# Patient Record
Sex: FEMALE | Race: WHITE
[De-identification: names, ages, dates, MRNs, and addresses within clinical notes are randomized per-mention and may not be internally consistent; named-entity substitution may affect disease eponyms.]

---

## 2018-01-03 ENCOUNTER — HOSPITAL ENCOUNTER (OUTPATIENT)
Dept: HOSPITAL 89 - CT | Age: 38
End: 2018-01-03
Attending: OBSTETRICS & GYNECOLOGY
Payer: COMMERCIAL

## 2018-01-03 VITALS — BODY MASS INDEX: 21.74 KG/M2

## 2018-01-03 DIAGNOSIS — R10.32: Primary | ICD-10-CM

## 2018-01-03 PROCEDURE — 74178 CT ABD&PLV WO CNTR FLWD CNTR: CPT

## 2018-01-03 NOTE — RADIOLOGY IMAGING REPORT
FACILITY: [facility] - [place] 

 

PATIENT NAME: Ana Leonardo

: 1980

MR: [id number]

V: [id number]

EXAM DATE: 

ORDERING PHYSICIAN: MOHSEN CAMPBELL

TECHNOLOGIST: 

 

Location: [facility] - [place]

Patient: Ana Leonardo

: 1980

MRN: [id number]

Visit/Account:[id number]

Date of Sevice:  2018

 

ACCESSION #: [id number]

 

CT abdomen and pelvis without and with with IV contrast

 

Indication: Left lower abdominal pain.

 

Comparison:   10/4/2017..

 

Technique:   Axial CT images were obtained through the abdomen and pelvis prior to and during injecti
on of nonionic iodinated intravenous contrast. Reformatted coronal and sagittal images were also obta
ined.

One of the following dose optimization techniques was utilized in the performance of this exam: Autom
ated exposure control; adjustment of the mA and/or kV according to the patient's size; or use of an i
terative  reconstruction technique.  Specific details can be referenced in the facility's radiology C
T exam operational policy.

Contrast:   75 ml of Isovue-370  IV contrast.

 

Findings:

Lower lung fields: Limited views lower lung field are unremarkable.

 

Liver: No focal parenchymal abnormality of the liver.

Biliary: Gallbladder appears unremarkable as well as the intra and extra hepatic biliary system.

Pancreas: Normal appearance.

Spleen: Normal appearance.

Adrenal glands: Unremarkable.

Kidneys / retroperitoneum: No evidence of nephrolithiasis or hydronephrosis is no solid renal lesions
.

 

 

Bowel / peritoneum / mesenteries: The visualized gastrointestinal tract is within normal limits. No f
ocal abnormality or obstruction. The appendix is not definitely visualized. No discrete secondary sig
ns of appendicitis.

No free air, free fluid, fluid collections or areas of inflammation.

 

Lymph node assessment: No pathologic adenopathy identified.

 

Pelvic  structures: The uterus is retroverted. There is again fluid seen in the endocervical canal 
which is unchanged from the previous examination. The aorta shows no other focal abnormality. The ova
nahomi are unremarkable. The remaining pelvic structures visualized within normal limits.

 

 

Vessels: No significant atherosclerotic calcifications seen throughout a nonaneurysmal abdominal aort
a and branches.

 

Musculoskeletal / Body wall: No acute or aggressive osseous abnormality. Bilateral pars defect at the
 L4 level without spondylolisthesis. This is unchanged.

 

 

 

IMPRESSION:

1. No acute intra-abdominal abnormality

2. Chronic stable findings as above.

 

Report Dictated By: Bhupendra Saucedo at 1/3/2018 8:36 PM

 

Report E-Signed By: Bhupendra Saucedo  at 1/3/2018 8:46 PM

 

WSN:[id number]

## 2018-01-25 RX ADMIN — ROPIVACAINE HYDROCHLORIDE ONE MCG: 2 INJECTION, SOLUTION EPIDURAL; INFILTRATION at 12:23

## 2018-01-25 RX ADMIN — ROPIVACAINE HYDROCHLORIDE ONE: 2 INJECTION, SOLUTION EPIDURAL; INFILTRATION at 12:23

## 2018-01-26 ENCOUNTER — HOSPITAL ENCOUNTER (OUTPATIENT)
Dept: HOSPITAL 89 - OR | Age: 38
Discharge: HOME | End: 2018-01-26
Attending: OBSTETRICS & GYNECOLOGY
Payer: COMMERCIAL

## 2018-01-26 VITALS — SYSTOLIC BLOOD PRESSURE: 112 MMHG | DIASTOLIC BLOOD PRESSURE: 74 MMHG

## 2018-01-26 VITALS — DIASTOLIC BLOOD PRESSURE: 72 MMHG | SYSTOLIC BLOOD PRESSURE: 108 MMHG

## 2018-01-26 VITALS — DIASTOLIC BLOOD PRESSURE: 79 MMHG | SYSTOLIC BLOOD PRESSURE: 117 MMHG

## 2018-01-26 VITALS
WEIGHT: 136 LBS | WEIGHT: 136 LBS | BODY MASS INDEX: 20.61 KG/M2 | HEIGHT: 68 IN | BODY MASS INDEX: 20.61 KG/M2 | HEIGHT: 68 IN

## 2018-01-26 VITALS — SYSTOLIC BLOOD PRESSURE: 108 MMHG | DIASTOLIC BLOOD PRESSURE: 78 MMHG

## 2018-01-26 VITALS — DIASTOLIC BLOOD PRESSURE: 85 MMHG | SYSTOLIC BLOOD PRESSURE: 112 MMHG

## 2018-01-26 VITALS — SYSTOLIC BLOOD PRESSURE: 113 MMHG | DIASTOLIC BLOOD PRESSURE: 82 MMHG

## 2018-01-26 VITALS — DIASTOLIC BLOOD PRESSURE: 69 MMHG | SYSTOLIC BLOOD PRESSURE: 101 MMHG

## 2018-01-26 DIAGNOSIS — N94.4: ICD-10-CM

## 2018-01-26 DIAGNOSIS — R10.32: ICD-10-CM

## 2018-01-26 DIAGNOSIS — N94.10: ICD-10-CM

## 2018-01-26 DIAGNOSIS — R10.31: Primary | ICD-10-CM

## 2018-01-26 LAB — PLATELET COUNT, AUTOMATED: 267 K/UL (ref 150–450)

## 2018-01-26 PROCEDURE — 88305 TISSUE EXAM BY PATHOLOGIST: CPT

## 2018-01-26 PROCEDURE — 36415 COLL VENOUS BLD VENIPUNCTURE: CPT

## 2018-01-26 PROCEDURE — 84703 CHORIONIC GONADOTROPIN ASSAY: CPT

## 2018-01-26 PROCEDURE — 58662 LAPAROSCOPY EXCISE LESIONS: CPT

## 2018-01-26 PROCEDURE — 85025 COMPLETE CBC W/AUTO DIFF WBC: CPT

## 2018-01-26 NOTE — SHORT(OUTPT) DISCHARGE SUMMARY
Discharge Summary


Reason for Hosp/Final Diag:  


(1) Endometriosis, pelvic peritoneum


Hospital Course & Plan:  s/p L-scope resection of endometriosis





Departure


Discharge to:  Home, Self Care





Discharge Instructions


Home Meds


Active Scripts


Hydrocodone Bit/Acetaminophen (HYDROCODON-ACETAMINOPHEN 5-325) 1 Each Tablet, 2 

EACH PO Q6H Y for PAIN, #20 TAB 0 Refills


   Prov:FERMIN DIETRICH MD         1/26/18


Reported Medications


Famotidine (FAMOTIDINE) 10 Mg Tablet, 10 MG PO QDAY


   9/20/17


Guaifenesin (MUCINEX) 100 Mg Gran.pack, 100 MG PO


   9/20/17


Cholecalciferol (Vitamin D3) (VITAMIN D) 400 Unit Capsule, PO QDAY, CAPSULE


   2/28/17


Folic Acid (FOLIC ACID) 1 Mg Tablet, 1 MG PO QDAY, TAB


   2/28/17


Ipratropium Bromide 17 Mcg/Act (ATROVENT HFA 17 MCG/ACT) 12.9 Gm Inh, 1 PUFF 

INH DAILY, INH


   4/5/16


Ibuprofen (IBUPROFEN) 200 Mg Tablet, 2 TAB PO Q6H Y for PAIN, TAB


   4/5/16


Pseudoephedrine Hcl (SUDAFED 12 HOUR) 120 Mg Tablet.er, 120 MG PO BID


   4/5/16


Discontinued Reported Medications


Clindamycin Hcl (CLINDAMYCIN HCL) 300 Mg Capsule, 300 MG PO Q6H, #40 CAPSULE


   9/20/17


Mirabegron (MYRBETRIQ) 25 Mg Tab.er.24h, 25 MG PO


   9/20/17


Levothyroxine Sodium (LEVOTHYROXINE SODIUM) 50 Mcg Tablet, PO QDAY, TAB


   2/28/17


Amoxicillin/Pot Clav 875-125 Mg Tab (AUGMENTIN 875-125 TABLET) 1 Each Tablet, 1 

TAB PO Q12H, TAB


   2/28/17


Discontinued Scripts


Promethazine Hcl (PROMETHAZINE HCL) 25 Mg Tablet, 25 MG PO Q4H Y for NAUSEA/

VOMITING, #14 TAB


   Prov:UBALDO PÉREZ DO         9/20/17


Hydromorphone Hcl (DILAUDID) 2 Mg Tablet, 2 MG PO Q4HR Y for PAIN, #12


   Prov:UBALDO PÉREZ DO         9/20/17


Follow up Referrals:  


OB/GYN - In Two Weeks @ [facility] with Fermin Dietrich Md





Diet:  Regular


Activity:  As Tolerated


Copies to:   FERMIN DIETRICH MD, TRAVIS MD Jan 26, 2018 12:02

## 2018-01-26 NOTE — POST OPERATIVE NOTE
Operative Note - OB/GYN


Operative Day


Date:  Jan 26, 2018


Time:  11:59





Physicians


Surgeon:  


Karlo


Anesthesia:  


GETA





Diagnosis


Pre-Op Diagnosis:  


Dysmenorrhea


Dysparunea


Abdominal pain


Post-Op Diagnosis:  


same





Procedure


Findings:  


endometriosis


Procedure(s):  


L-scope resection of endometriosis


L-scope fulguration of endometriosis


Specimen Removed:(Maybe N/A):  


pelvic peritoneum


Complications:  


[id number]





Fluids


Fluids:  


1300


Estimated Blood Loss:  


minimal





Dictated


Date OP Note Dictated:  Jan 26, 2018


Time OP Note Dictated:  12:00


Copies to:   JERED SANCHEZ MD, TRAVIS MD Jan 26, 2018 12:00

## 2018-01-26 NOTE — OPERATIVE REPORT 1
EVENT DATE:  January 26, 2018

SURGEON:  Fermin Dietrich MD

ANESTHESIOLOGIST:  Wilber Scherer MD

ANESTHESIA:  General endotracheal.





PREOPERATIVE DIAGNOSES 

1.  Abdominal pain, right lower quadrant.

2.  Abdominal pain, left lower quadrant. 

3.  Dyspareunia.

4.  Dysmenorrhea.



POSTOPERATIVE DIAGNOSES 

1.  Abdominal pain, right lower quadrant.

2.  Abdominal pain, left lower quadrant. 

3.  Dyspareunia.

4.  Dysmenorrhea.

5.  Pelvic peritoneal endometriosis.  



PROCEDURES PERFORMED

1.  Laparoscopic resection of endometriosis.

2.  Laparoscopic fulguration of endometriosis.  



ESTIMATED BLOOD LOSS 

Minimal.



FLUIDS

Crystalloid 1300 mL IV.



FINDINGS

Normal right upper quadrant.  Normal abdominal contents including intestine.  
Pelvis had an enlarged uterus, boggy in appearance.  Normal-appearing right 
tube and ovary.  The left tube and ovary were lightly adhered with filmy 
adhesions to the posterior ovarian fossa and posterior cul-de-sac.  There was 
also pelvic peritoneal endometriosis present within the posterior cul-de-sac 
with scarring of the peritoneum in that location and generalized Swiss cheese-
appearing peritoneal surface consistent with chronic inflammation.  Significant 
endometriosis-like lesion along the left uterosacral ligament.  



PROCEDURE IN DETAIL

The patient was brought to the operating room with a working IV and placed in 
the dorsal supine position on the operating table.  She was placed under 
general endotracheal anesthesia and moved to the dorsal lithotomy position.  
The patient was then prepped and draped in the usual sterile fashion.  A 
weighted speculum was placed in the vagina.  The cervix was grasped on the 
anterior lip with a single-toothed tenaculum.  The uterus was then sounded to a 
depth of 11 cm, retroverted.  A size 10 ANDRAE uterine manipulator was selected 
and assembled.  The cervix was dilated to accommodate.  It was passed through 
the cervix into the uterus, and bulb inflated and secured.  All other 
instruments were then removed.  The legs were brought back to the supine 
position, and gloves were changed.  The umbilicus was infiltrated with 0.2% 
Naropin.  A 5 mm stab incision was made.  A Veress was passed through this 
incision while stabilizing the anterior abdominal wall, and a pneumoperitoneum 
was created to an intra-abdominal pressure of 20 mmHg.  The Veress needle was 
removed.  A 5 mm bladeless trocar was passed through this umbilical incision 
into the abdomen under direct visualization with the scope.  The abdomen and 
pelvis were surveyed with the above findings noted.  Two additional 5 mm ports 
were placed in the suprapubic and left lower quadrant locations and without 
incident.  The peritoneum along the left uterosacral ligament was grasped with 
laparoscopic graspers and put on stretch.  Excision of endometriosis on the 
peritoneum in this location was performed.  It was sent to Pathology for 
analysis.  Using bipolar coag using the [employer] device, the biopsy bed and the 
remaining endometriosis lesions were fulgurated after first identifying the 
ureter and observing peristalsis along its length in order to ensure no 
electrocautery was performed near the ureter.  Upon completion, the pelvis was 
irrigated.  There was a slight amount of capillary oozing from the biopsy site; 
therefore, Reyna hemostatic agent was applied to the biopsy site.  Upon 
completion, no visible complications.  There was also an endometriosis lesion 
on the left ovary which was fulgurated as well.  The pneumoperitoneum was 
suctioned out.  All instruments were removed from the abdomen.  Skin incisions 
were repaired with 4-0 Monocryl simple subdermal and covered with Dermabond 
skin adhesive.  The ANDRAE uterine manipulator was removed.  All instruments and 
sponges were accounted for.  No complications.
MANOJD

## 2019-01-03 ENCOUNTER — HOSPITAL ENCOUNTER (OUTPATIENT)
Dept: HOSPITAL 89 - RAD | Age: 39
End: 2019-01-03
Attending: PHYSICIAN ASSISTANT
Payer: COMMERCIAL

## 2019-01-03 VITALS — BODY MASS INDEX: 21.74 KG/M2

## 2019-01-03 DIAGNOSIS — R05: Primary | ICD-10-CM

## 2019-01-03 PROCEDURE — 71046 X-RAY EXAM CHEST 2 VIEWS: CPT

## 2019-01-03 NOTE — RADIOLOGY IMAGING REPORT
FACILITY: [facility] - [place] 

 

PATIENT NAME: Ana Leonardo

: 1980

MR: [id number]

V: [id number]

EXAM DATE: 

ORDERING PHYSICIAN: LEILANI DESAI

TECHNOLOGIST: 

 

Location: [facility]

Patient: Ana Leonardo

: 1980

MRN: [id number]

Visit/Account:[id number]

Date of Sevice:  2019

 

ACCESSION #: [id number]

 

Chest with lateral, two views.

 

HISTORY: Cough, not feeling well.

 

COMPARISON: None.

 

Several small metallic objects project on the midthoracic spine.  The heart and mediastinum are unrem
arkable.  Pulmonary vessels are unremarkable.  The lungs are voluminous. The pleural surfaces are unr
emarkable. No pneumothorax.  Minimal degenerative changes are present in the spine.

 

IMPRESSION:

 

Voluminous lungs.

 

Otherwise no evidence of acute cardiopulmonary disease.

 

Report Dictated By: Bala Leonardo MD at 1/3/2019 10:43 AM

 

Report E-Signed By: Bala Leonardo MD  at 1/3/2019 10:46 AM

 

WSN:[id number]

## 2019-02-02 ENCOUNTER — HOSPITAL ENCOUNTER (OUTPATIENT)
Dept: HOSPITAL 89 - ZZGRANDUC | Age: 39
End: 2019-02-02
Attending: NURSE PRACTITIONER
Payer: COMMERCIAL

## 2019-02-02 VITALS — BODY MASS INDEX: 21.74 KG/M2

## 2019-02-02 DIAGNOSIS — R05: Primary | ICD-10-CM

## 2019-02-02 DIAGNOSIS — R50.9: ICD-10-CM

## 2019-02-02 LAB — PLATELET COUNT, AUTOMATED: 323 K/UL (ref 150–450)

## 2019-02-02 PROCEDURE — 82310 ASSAY OF CALCIUM: CPT

## 2019-02-02 PROCEDURE — 85025 COMPLETE CBC W/AUTO DIFF WBC: CPT

## 2019-02-02 PROCEDURE — 82374 ASSAY BLOOD CARBON DIOXIDE: CPT

## 2019-02-02 PROCEDURE — 82247 BILIRUBIN TOTAL: CPT

## 2019-02-02 PROCEDURE — 84450 TRANSFERASE (AST) (SGOT): CPT

## 2019-02-02 PROCEDURE — 84520 ASSAY OF UREA NITROGEN: CPT

## 2019-02-02 PROCEDURE — 84075 ASSAY ALKALINE PHOSPHATASE: CPT

## 2019-02-02 PROCEDURE — 84132 ASSAY OF SERUM POTASSIUM: CPT

## 2019-02-02 PROCEDURE — 84460 ALANINE AMINO (ALT) (SGPT): CPT

## 2019-02-02 PROCEDURE — 84295 ASSAY OF SERUM SODIUM: CPT

## 2019-02-02 PROCEDURE — 82435 ASSAY OF BLOOD CHLORIDE: CPT

## 2019-02-02 PROCEDURE — 82947 ASSAY GLUCOSE BLOOD QUANT: CPT

## 2019-02-02 PROCEDURE — 82565 ASSAY OF CREATININE: CPT

## 2019-02-02 PROCEDURE — 82040 ASSAY OF SERUM ALBUMIN: CPT

## 2019-02-02 PROCEDURE — 84155 ASSAY OF PROTEIN SERUM: CPT

## 2019-02-22 ENCOUNTER — HOSPITAL ENCOUNTER (OUTPATIENT)
Dept: HOSPITAL 89 - ZZGRANDUC | Age: 39
End: 2019-02-22
Attending: NURSE PRACTITIONER
Payer: COMMERCIAL

## 2019-02-22 VITALS — BODY MASS INDEX: 21.74 KG/M2

## 2019-02-22 DIAGNOSIS — R68.83: ICD-10-CM

## 2019-02-22 DIAGNOSIS — R11.10: Primary | ICD-10-CM

## 2019-02-22 LAB — PLATELET COUNT, AUTOMATED: 283 K/UL (ref 150–450)

## 2019-02-22 PROCEDURE — 84132 ASSAY OF SERUM POTASSIUM: CPT

## 2019-02-22 PROCEDURE — 82947 ASSAY GLUCOSE BLOOD QUANT: CPT

## 2019-02-22 PROCEDURE — 82247 BILIRUBIN TOTAL: CPT

## 2019-02-22 PROCEDURE — 84520 ASSAY OF UREA NITROGEN: CPT

## 2019-02-22 PROCEDURE — 84295 ASSAY OF SERUM SODIUM: CPT

## 2019-02-22 PROCEDURE — 84155 ASSAY OF PROTEIN SERUM: CPT

## 2019-02-22 PROCEDURE — 84075 ASSAY ALKALINE PHOSPHATASE: CPT

## 2019-02-22 PROCEDURE — 85025 COMPLETE CBC W/AUTO DIFF WBC: CPT

## 2019-02-22 PROCEDURE — 82435 ASSAY OF BLOOD CHLORIDE: CPT

## 2019-02-22 PROCEDURE — 82040 ASSAY OF SERUM ALBUMIN: CPT

## 2019-02-22 PROCEDURE — 82374 ASSAY BLOOD CARBON DIOXIDE: CPT

## 2019-02-22 PROCEDURE — 84460 ALANINE AMINO (ALT) (SGPT): CPT

## 2019-02-22 PROCEDURE — 82565 ASSAY OF CREATININE: CPT

## 2019-02-22 PROCEDURE — 82310 ASSAY OF CALCIUM: CPT

## 2019-02-22 PROCEDURE — 84450 TRANSFERASE (AST) (SGOT): CPT

## 2019-08-08 ENCOUNTER — HOSPITAL ENCOUNTER (OUTPATIENT)
Dept: HOSPITAL 89 - CT | Age: 39
End: 2019-08-08
Attending: FAMILY MEDICINE
Payer: COMMERCIAL

## 2019-08-08 VITALS — BODY MASS INDEX: 21.74 KG/M2

## 2019-08-08 DIAGNOSIS — R10.33: Primary | ICD-10-CM

## 2019-08-08 PROCEDURE — 74177 CT ABD & PELVIS W/CONTRAST: CPT

## 2019-08-08 NOTE — RADIOLOGY IMAGING REPORT
FACILITY: [facility] - [place] 

 

PATIENT NAME: Ana Leonardo

: 1980

MR: [id number]

V: [id number]

EXAM DATE: 

ORDERING PHYSICIAN: MULU CAPUTO

TECHNOLOGIST: 

 

Location: [facility]

Patient: Ana Leonardo

: 1980

MRN: [id number]

Visit/Account:[id number]

Date of Sevice:  2019

 

ACCESSION #: [id number]

 

EXAMINATION: CT abdomen and pelvis with contrast

 

COMPARISON: 1/3/2018 and earlier.

 

HISTORY: Periumbilical pain.

 

PROCEDURE: Multiplanar contrast enhanced CT of the abdomen and pelvis with 75 mL intravenous Isovue 3
70. One of the following dose optimization techniques was utilized in the performance of this exam: A
utomated exposure control; adjustment of the mA and/or kV according to the patient's size; or use of 
an iterative  reconstruction technique.  Specific details can be referenced in the facility's radiolo
gy CT exam operational policy.

 

FINDINGS:

Visualized thorax: Negative.

 

Liver: Negative.

 

Gallbladder and biliary system: Negative

 

Spleen: Negative.

 

Pancreas: Negative.

 

Adrenal glands: Negative.

 

Kidneys and bladder: Negative.

 

Vessels: Bilateral pelvic venous congestion. Otherwise negative.

 

Bowel and mesentery: The stomach and small bowel are unremarkable. The cecal tip is surrounded by loo
ps of bowel in the appendix is not identified; there is no pericecal inflammation. Moderate amount st
ool in the colon.

 

Pelvic organs: Retroverted uterus. Otherwise negative.

 

Lymph nodes: No adenopathy.

 

Free air/free fluid: None.

 

Musculoskeletal: L4 bilateral chronic pars defects. No spondylolisthesis. No acute abnormality.

 

IMPRESSION:

 

1. The appendix is not identified. No pericecal inflammation is identified and appendicitis is consid
ered unlikely but continued clinical observation is recommended with surgical consultation as clinica
lly indicated.

2. No findings of acute disease are otherwise identified in the abdomen or pelvis.

3. L4 bilateral chronic pars defects. No spondylolisthesis.

 

Results were discussed with MULU CAPUTO at 2019 5:12 PM.

 

Report Dictated By: Chris Duarte MD at 2019 4:51 PM

 

Report E-Signed By: Chris Duarte MD  at 2019 5:13 PM

 

WSN:[id number]